# Patient Record
Sex: FEMALE | Race: BLACK OR AFRICAN AMERICAN
[De-identification: names, ages, dates, MRNs, and addresses within clinical notes are randomized per-mention and may not be internally consistent; named-entity substitution may affect disease eponyms.]

---
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
## 2022-12-05 ENCOUNTER — HOSPITAL ENCOUNTER (OUTPATIENT)
Dept: HOSPITAL 92 - BICRAD | Age: 77
Discharge: HOME | End: 2022-12-05
Attending: ORTHOPAEDIC SURGERY
Payer: COMMERCIAL

## 2022-12-05 DIAGNOSIS — Z98.1: ICD-10-CM

## 2022-12-05 DIAGNOSIS — M47.812: ICD-10-CM

## 2022-12-05 DIAGNOSIS — M41.9: Primary | ICD-10-CM

## 2022-12-05 PROCEDURE — 72081 X-RAY EXAM ENTIRE SPI 1 VW: CPT
